# Patient Record
Sex: FEMALE | NOT HISPANIC OR LATINO | URBAN - METROPOLITAN AREA
[De-identification: names, ages, dates, MRNs, and addresses within clinical notes are randomized per-mention and may not be internally consistent; named-entity substitution may affect disease eponyms.]

---

## 2023-05-18 DIAGNOSIS — Z59.9 INADEQUATE COMMUNITY RESOURCES: Primary | ICD-10-CM

## 2023-05-18 SDOH — ECONOMIC STABILITY - INCOME SECURITY: PROBLEM RELATED TO HOUSING AND ECONOMIC CIRCUMSTANCES, UNSPECIFIED: Z59.9

## 2023-07-05 ENCOUNTER — OFFICE VISIT (OUTPATIENT)
Dept: FAMILY MEDICINE CLINIC | Facility: CLINIC | Age: 33
End: 2023-07-05

## 2023-07-05 VITALS
SYSTOLIC BLOOD PRESSURE: 100 MMHG | WEIGHT: 211 LBS | HEIGHT: 66 IN | HEART RATE: 91 BPM | DIASTOLIC BLOOD PRESSURE: 66 MMHG | OXYGEN SATURATION: 98 % | BODY MASS INDEX: 33.91 KG/M2 | TEMPERATURE: 97.8 F

## 2023-07-05 DIAGNOSIS — M25.562 ACUTE PAIN OF LEFT KNEE: Primary | ICD-10-CM

## 2023-07-05 DIAGNOSIS — Z59.89 DOES NOT HAVE HEALTH INSURANCE: ICD-10-CM

## 2023-07-05 PROCEDURE — 99213 OFFICE O/P EST LOW 20 MIN: CPT | Performed by: FAMILY MEDICINE

## 2023-07-05 SDOH — ECONOMIC STABILITY - INCOME SECURITY: OTHER PROBLEMS RELATED TO HOUSING AND ECONOMIC CIRCUMSTANCES: Z59.89

## 2023-07-05 NOTE — PROGRESS NOTES
Name: Adriane Cash      : 1990      MRN: [id number]  Encounter Provider: Smooth Cuba MD  Encounter Date: 2023   Encounter department: [address]     1. Acute pain of left knee  Assessment & Plan:  -pt given educational material regarding rest, ice and compression and strengthening exercises to initiate at home as tolerated  -recommended NSAIDs x2 daily (morning and night) for 1 week duration  -pt has no insurance and cannot afford physical therapy  -referral to social work for assistance in getting insurance  -follow up in 1 month  -will reassess need for imaging or PT at that time           Subjective      Patient is a 35year old female presenting for left knee pain and "crackling sounds" when she moves for past month  -worse when going up and down stairs and going from sitting to standing  -pain radiates down to left foot  -patient is currently breastfeeding and was concerned about taking medications    Review of Systems   Constitutional: Negative for chills and fever. HENT: Negative for ear pain and rhinorrhea. Respiratory: Negative for cough and shortness of breath. Cardiovascular: Negative for chest pain and palpitations. Gastrointestinal: Negative for constipation and diarrhea. Musculoskeletal:        Left knee pain   Neurological: Negative for weakness. No current outpatient medications on file prior to visit. Objective     /66 (BP Location: Right arm, Patient Position: Sitting, Cuff Size: Standard)   Pulse 91   Temp 97.8 °F (36.6 °C) (Temporal)   Ht 5' 5.6" (1.666 m)   Wt 95.7 kg (211 lb)   SpO2 98%   BMI 34.47 kg/m²     Physical Exam  Constitutional:       Appearance: Normal appearance. HENT:      Head: Normocephalic and atraumatic. Nose: Nose normal.   Cardiovascular:      Rate and Rhythm: Normal rate and regular rhythm. Heart sounds: No murmur heard.      No friction rub. No gallop. Pulmonary:      Effort: Pulmonary effort is normal.      Breath sounds: Normal breath sounds. Musculoskeletal:         General: Normal range of motion. Right knee: Normal. No swelling, deformity, ecchymosis, lacerations or crepitus. Normal range of motion. No medial joint line, lateral joint line or patellar tendon tenderness. No ACL laxity or PCL laxity. Normal patellar mobility. Instability Tests: Anterior drawer test negative. Posterior drawer test negative. Medial Candice test negative and lateral Candice test negative. Left knee: Crepitus present. No swelling, deformity, ecchymosis or lacerations. Normal range of motion. Tenderness present over the medial joint line and lateral joint line. No patellar tendon tenderness. No ACL laxity or PCL laxity. Normal patellar mobility. Instability Tests: Anterior drawer test negative. Posterior drawer test negative. Medial Candice test positive and lateral Candice test positive. Right foot: No swelling or deformity. Left foot: No swelling, deformity, tenderness (Pain over lateral forefoot) or crepitus. Normal pulse. Comments: 5/5 lower extremity strength bilaterally   Neurological:      Mental Status: She is alert.        Ángel Avila MD

## 2023-07-05 NOTE — ASSESSMENT & PLAN NOTE
-pt given educational material regarding rest, ice and compression and strengthening exercises to initiate at home as tolerated  -recommended NSAIDs x2 daily (morning and night) for 1 week duration  -pt has no insurance and cannot afford physical therapy  -referral to social work for assistance in getting insurance  -follow up in 1 month  -will reassess need for imaging or PT at that time

## 2024-01-31 ENCOUNTER — PATIENT OUTREACH (OUTPATIENT)
Dept: FAMILY MEDICINE CLINIC | Facility: CLINIC | Age: 34
End: 2024-01-31

## 2024-01-31 NOTE — PROGRESS NOTES
[facility] received a new referral on 07/05/2023 in regard to pt with concerns about health insurance. When pt was seen in our office he did not have any health insurance. I attempted to use [employer] services to reach pt today. After waiting on hold, I was advised Italian interpreters are limited and there are non available at this time. [facility] will attempt outreach on a later date.

## 2024-02-06 ENCOUNTER — PATIENT OUTREACH (OUTPATIENT)
Dept: FAMILY MEDICINE CLINIC | Facility: CLINIC | Age: 34
End: 2024-02-06

## 2024-02-06 NOTE — PROGRESS NOTES
Chart review completed. Per chart review, pt was seen in office on 7/5 for left knee pain. Per chart, pt does not have health insurance. SW referral was placed to assist pt with health insurance.     Pt is [place] speaking. OP SWCM placed call to [employer]  Line. OP SWCM was connected with an  [place], but then was disconnected from . OP SWCM re-dialed to connect with another  and OP SWCM was connected to  #[id number].    Pt was called using [employer] line  #[id number]. Pt answered call. Pt spoke to . Pt stated she was not aware of the doctor that SW was talking about. OP SWCM further explained that the appt was back in July and we had a difficult time connecting due to interpretation issues. Pt stated that she was not interested in speaking with SW at this time. Pt has not been seen in office since. OP SWCM will close out referral.

## 2025-06-13 ENCOUNTER — OFFICE VISIT (OUTPATIENT)
Dept: URGENT CARE | Age: 35
End: 2025-06-13
Payer: COMMERCIAL

## 2025-06-13 VITALS
DIASTOLIC BLOOD PRESSURE: 80 MMHG | WEIGHT: 212 LBS | RESPIRATION RATE: 18 BRPM | TEMPERATURE: 98.4 F | HEART RATE: 90 BPM | BODY MASS INDEX: 34.64 KG/M2 | OXYGEN SATURATION: 100 % | SYSTOLIC BLOOD PRESSURE: 132 MMHG

## 2025-06-13 DIAGNOSIS — H61.23 BILATERAL HEARING LOSS DUE TO CERUMEN IMPACTION: Primary | ICD-10-CM

## 2025-06-13 PROCEDURE — G0381 LEV 2 HOSP TYPE B ED VISIT: HCPCS | Performed by: NURSE PRACTITIONER

## 2025-06-13 PROCEDURE — 99282 EMERGENCY DEPT VISIT SF MDM: CPT | Performed by: NURSE PRACTITIONER

## 2025-06-14 NOTE — PATIENT INSTRUCTIONS
Continue with the antibiotic prescribed by the emergency room today  Follow up with ENT consult placed by the ER  Recommend debrox ear drops twice a day as needed to soften ear wax  Ibuprofen as needed for pain    Follow up with PCP in 3-5 days.  Proceed to  ER if symptoms worsen.     If tests are performed, our office will contact you with results only if changes need to made to the care plan discussed with you at the visit. You can review your full results on [facility].

## 2025-06-14 NOTE — PROGRESS NOTES
Name: Gabriella Eller      : 1990      MRN: [id number]  Encounter Provider: AYLEEN Gorman  Encounter Date: 2025   Encounter department: [facility]  :  Assessment & Plan  Bilateral hearing loss due to cerumen impaction  Discussed with patient and  that we cannot flush ears or attempt to manually remove wax if she is having pain.  Encouraged her to take ibuprofen for the pain and continue antibiotics and wax softening drops recommended by the ED.    Continue with the antibiotic prescribed by the emergency room today  Follow up with ENT consult placed by the ER  Recommend debrox ear drops twice a day as needed to soften ear wax  Ibuprofen as needed for pain    Follow up with PCP in 3-5 days.  Proceed to  ER if symptoms worsen.     If tests are performed, our office will contact you with results only if changes need to made to the care plan discussed with you at the visit. You can review your full results on [facility].             History of Present Illness   HPI  Gabriella Eller is a 35 y.o. female who presents with bilateral ear pain and fullness related to build up of ear wax.  Patient was seen in [facility] ED today and prescribed Amoxicillin, ibuprofen and an ENT consult was placed.  She was also recommended to use ear drops to help soften the wax.  She has not taken anything for the pain but has started her antibiotics as prescribed.  Denies fever.      Review of Systems   Constitutional:  Negative for chills and fever.   HENT:  Positive for ear pain. Negative for ear discharge and sore throat.         Bilateral ear pain and fullness   All other systems reviewed and are negative.         Objective   /80 (BP Location: Left arm, Patient Position: Sitting, Cuff Size: Large)   Pulse 90   Temp 98.4 °F (36.9 °C) (Tympanic)   Resp 18   Wt 96.2 kg (212 lb)   LMP 2025   SpO2 100%   BMI 34.64 kg/m²      Physical Exam  Vitals and nursing note  reviewed.   Constitutional:       General: She is not in acute distress.     Appearance: Normal appearance. She is well-developed. She is not ill-appearing.   HENT:      Head: Normocephalic and atraumatic.      Comments: Unable to visualize TMs due to cerumen impaction       Right Ear: Ear canal normal. There is impacted cerumen.      Left Ear: Ear canal normal. There is impacted cerumen.     Skin:     General: Skin is warm and dry.     Neurological:      Mental Status: She is alert.